# Patient Record
Sex: MALE | Race: WHITE | Employment: OTHER | URBAN - METROPOLITAN AREA
[De-identification: names, ages, dates, MRNs, and addresses within clinical notes are randomized per-mention and may not be internally consistent; named-entity substitution may affect disease eponyms.]

---

## 2019-02-01 ENCOUNTER — HOSPITAL ENCOUNTER (EMERGENCY)
Facility: CLINIC | Age: 54
Discharge: HOME | End: 2019-02-02
Attending: EMERGENCY MEDICINE | Admitting: EMERGENCY MEDICINE
Payer: MEDICARE

## 2019-02-01 DIAGNOSIS — N20.0 KIDNEY STONE: ICD-10-CM

## 2019-02-01 LAB
ALBUMIN UR-MCNC: 100 MG/DL
ANION GAP SERPL CALCULATED.3IONS-SCNC: 5 MMOL/L (ref 3–14)
APPEARANCE UR: ABNORMAL
BILIRUB UR QL STRIP: NEGATIVE
BUN SERPL-MCNC: 17 MG/DL (ref 7–30)
CALCIUM SERPL-MCNC: 8.6 MG/DL (ref 8.5–10.1)
CHLORIDE SERPL-SCNC: 105 MMOL/L (ref 94–109)
CO2 SERPL-SCNC: 29 MMOL/L (ref 20–32)
COLOR UR AUTO: ABNORMAL
CREAT SERPL-MCNC: 1.1 MG/DL (ref 0.66–1.25)
GFR SERPL CREATININE-BSD FRML MDRD: 75 ML/MIN/{1.73_M2}
GLUCOSE SERPL-MCNC: 78 MG/DL (ref 70–99)
GLUCOSE UR STRIP-MCNC: NEGATIVE MG/DL
HGB UR QL STRIP: ABNORMAL
KETONES UR STRIP-MCNC: 5 MG/DL
LEUKOCYTE ESTERASE UR QL STRIP: NEGATIVE
NITRATE UR QL: NEGATIVE
PH UR STRIP: 6 PH (ref 5–7)
POTASSIUM SERPL-SCNC: 3.4 MMOL/L (ref 3.4–5.3)
RBC #/AREA URNS AUTO: 114 /HPF (ref 0–2)
SODIUM SERPL-SCNC: 139 MMOL/L (ref 133–144)
SOURCE: ABNORMAL
SP GR UR STRIP: 1.03 (ref 1–1.03)
UROBILINOGEN UR STRIP-MCNC: NEGATIVE MG/DL (ref 0–2)
WBC #/AREA URNS AUTO: <1 /HPF (ref 0–5)

## 2019-02-01 PROCEDURE — 81001 URINALYSIS AUTO W/SCOPE: CPT | Performed by: EMERGENCY MEDICINE

## 2019-02-01 PROCEDURE — 96375 TX/PRO/DX INJ NEW DRUG ADDON: CPT

## 2019-02-01 PROCEDURE — 85025 COMPLETE CBC W/AUTO DIFF WBC: CPT | Performed by: EMERGENCY MEDICINE

## 2019-02-01 PROCEDURE — 96374 THER/PROPH/DIAG INJ IV PUSH: CPT

## 2019-02-01 PROCEDURE — 96376 TX/PRO/DX INJ SAME DRUG ADON: CPT

## 2019-02-01 PROCEDURE — 25000128 H RX IP 250 OP 636: Performed by: EMERGENCY MEDICINE

## 2019-02-01 PROCEDURE — 99285 EMERGENCY DEPT VISIT HI MDM: CPT | Mod: 25

## 2019-02-01 PROCEDURE — 80048 BASIC METABOLIC PNL TOTAL CA: CPT | Performed by: EMERGENCY MEDICINE

## 2019-02-01 RX ORDER — KETOROLAC TROMETHAMINE 30 MG/ML
30 INJECTION, SOLUTION INTRAMUSCULAR; INTRAVENOUS ONCE
Status: COMPLETED | OUTPATIENT
Start: 2019-02-01 | End: 2019-02-01

## 2019-02-01 RX ORDER — ONDANSETRON 2 MG/ML
4 INJECTION INTRAMUSCULAR; INTRAVENOUS EVERY 30 MIN PRN
Status: DISCONTINUED | OUTPATIENT
Start: 2019-02-01 | End: 2019-02-02 | Stop reason: HOSPADM

## 2019-02-01 RX ORDER — HYDROMORPHONE HYDROCHLORIDE 1 MG/ML
0.5 INJECTION, SOLUTION INTRAMUSCULAR; INTRAVENOUS; SUBCUTANEOUS
Status: DISCONTINUED | OUTPATIENT
Start: 2019-02-01 | End: 2019-02-02 | Stop reason: HOSPADM

## 2019-02-01 RX ADMIN — Medication 0.5 MG: at 23:54

## 2019-02-01 RX ADMIN — Medication 0.5 MG: at 23:23

## 2019-02-01 RX ADMIN — KETOROLAC TROMETHAMINE 30 MG: 30 INJECTION, SOLUTION INTRAMUSCULAR at 21:45

## 2019-02-01 RX ADMIN — ONDANSETRON 4 MG: 2 INJECTION INTRAMUSCULAR; INTRAVENOUS at 21:45

## 2019-02-01 NOTE — ED AVS SNAPSHOT
[facility] Emergency Department  [address]  [facility] [place] [address]  Phone:  [phone]  Fax:  [phone]                                    Luis Randhawa   MRN: [id number]    Department:  [facility] Emergency Department   Date of Visit:  2/1/2019           After Visit Summary Signature Page    I have received my discharge instructions, and my questions have been answered. I have discussed any challenges I see with this plan with the nurse or doctor.    ..........................................................................................................................................  Patient/Patient Representative Signature      ..........................................................................................................................................  Patient Representative Print Name and Relationship to Patient    ..................................................               ................................................  Date                                   Time    ..........................................................................................................................................  Reviewed by Signature/Title    ...................................................              ..............................................  Date                                               Time          22EPIC Rev 08/18

## 2019-02-02 VITALS
SYSTOLIC BLOOD PRESSURE: 142 MMHG | DIASTOLIC BLOOD PRESSURE: 85 MMHG | RESPIRATION RATE: 14 BRPM | TEMPERATURE: 98 F | OXYGEN SATURATION: 96 % | HEART RATE: 81 BPM

## 2019-02-02 LAB
BASOPHILS # BLD AUTO: 0.1 10E9/L (ref 0–0.2)
BASOPHILS NFR BLD AUTO: 1.6 %
DIFFERENTIAL METHOD BLD: ABNORMAL
EOSINOPHIL # BLD AUTO: 0.1 10E9/L (ref 0–0.7)
EOSINOPHIL NFR BLD AUTO: 1.6 %
ERYTHROCYTE [DISTWIDTH] IN BLOOD BY AUTOMATED COUNT: ABNORMAL % (ref 10–15)
HCT VFR BLD AUTO: ABNORMAL % (ref 40–53)
HGB BLD-MCNC: 14.7 G/DL (ref 13.3–17.7)
IMM GRANULOCYTES # BLD: 0 10E9/L (ref 0–0.4)
IMM GRANULOCYTES NFR BLD: 0.2 %
LYMPHOCYTES # BLD AUTO: 1.9 10E9/L (ref 0.8–5.3)
LYMPHOCYTES NFR BLD AUTO: 43 %
MCH RBC QN AUTO: ABNORMAL PG (ref 26.5–33)
MCHC RBC AUTO-ENTMCNC: ABNORMAL G/DL (ref 31.5–36.5)
MCV RBC AUTO: ABNORMAL FL (ref 78–100)
MONOCYTES # BLD AUTO: 0.5 10E9/L (ref 0–1.3)
MONOCYTES NFR BLD AUTO: 10.8 %
NEUTROPHILS # BLD AUTO: 1.9 10E9/L (ref 1.6–8.3)
NEUTROPHILS NFR BLD AUTO: 42.8 %
NRBC # BLD AUTO: 0 10*3/UL
NRBC BLD AUTO-RTO: 0 /100
PLATELET # BLD AUTO: 140 10E9/L (ref 150–450)
RBC # BLD AUTO: ABNORMAL 10E12/L (ref 4.4–5.9)
WBC # BLD AUTO: 4.4 10E9/L (ref 4–11)

## 2019-02-02 NOTE — ED PROVIDER NOTES
History     Chief Complaint:  Flank Pain      HPI   Luis Randhawa is a 53 year old male, with a history of multiple prior kidney stones, who presents with left-sided flank pain radiating down to in the left lower quadrant of his abdomen that began abruptly this afternoon prior to presentation.  The patient says the pain feels classic compared to his prior stones and he is convinced it that is the diagnosis.  He has had a recent CT scan of his abdomen (done prior to gallbladder surgery which was completed during the first week of December) that did show one intrarenal stone on the right and 2 stones intrarenally on the left.  He does not know or remember the diameters of the stones on the left but he thinks that 1 of those must be moving today.  He has not noted any urinary symptoms or hematuria.  No fevers or chills.  No rash.  No recent injury.    Allergies:  No known drug allergies.    Medications:    The patient is not currently taking any prescribed medications.     Past Medical History:    Kidney stones  Neuropathy     Past Surgical History:    Cholecystectomy     Family History:    History reviewed. No pertinent family history.    Social History:  Presents to the ED with his wife  Non-smoker  They are visiting from [place], [place]    Review of Systems  As noted above, otherwise negative    Physical Exam   First Vitals:  BP: (!) 169/100  Heart Rate: 68  Temp: 98  F (36.7  C)  Resp: 16  SpO2: 99 %      Physical Exam  Constitutional:  Appears well-developed and well-nourished. Alert.  Uncomfortable and holding his left side, but conversant. Non toxic.  HENT:   Head: Atraumatic.   Nose: Nose normal.  Mouth/Throat: Oral mucosa is clear and moist. no trismus. Pharynx normal. Tonsils symmetric. No tonsillar enlargement, erythema, or exudate.  Eyes: Conjunctivae normal. EOM normal. Pupils equal, round, and reactive to light. No scleral icterus.   Neck: Normal range of motion. Neck supple. No tracheal  deviation present.   Cardiovascular: Normal rate, regular rhythm. No gallop. No friction rub. No murmur heard. Symmetric radial artery pulses   Pulmonary/Chest: Effort normal. No stridor. No respiratory distress. No wheezes. No rales. No rhonchi . No tenderness.   Abdominal: Soft. Bowel sounds normal. No distension. No mass. No left upper quadrant tenderness or splenomegaly. 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 No rebound. No guarding.   Musculoskeletal:   RUE: Normal range of motion. No tenderness. No deformity  LUE: Normal range of motion. No tenderness. No deformity  RLE: Normal range of motion. No edema. No tenderness. No deformity  LLE: Normal range of motion. No edema. No tenderness. No deformity  Lymph: No cervical adenopathy.   Neurological: Alert and oriented to person, place, and time. Normal strength. CN II-VII intact. No sensory deficit. GCS eye subscore is 4. GCS verbal subscore is 5. GCS motor subscore is 6. Normal coordination   Skin: Skin is warm and dry. No rash noted. No pallor. Normal capillary refill.  Psychiatric:  Normal mood. Normal affect.     Emergency Department Course     Laboratory:  CBC:  WBC 4.4, HGB 14.7,  (L)  BMP: WNL (Creatinine 1.10)  UA: Cloudy red urine, ketones 5, large blood,  (H), otherwise WNL    Interventions:  2145: Zofran, 4 mg, IV injection   2145: Toradol, 15 mg, IV injection  Recheck pain actually worse up to an 8 or 9/10  2323: Dilaudid, 0.5 mg, IV injection x3    Emergency Department Course:  The patient arrived in triage where vitals were measured and recorded.   The patient was then escorted back to the emergency department.   The patient's medical records were reviewed.  Nursing notes and vitals were reviewed.  2150: I performed an exam of the patient as documented above.  The above workup was undertaken.  0033: I rechecked the patient and discussed results.  Findings and plan explained to the Patient. Patient discharged  home, status improved, with instructions regarding supportive care, medications, and reasons to return as well as the importance of close follow-up was reviewed.     Impression & Plan      Medical Decision Making:  Luis Randhawa is a 53 year old male who presents with left flank pain radiating into his left lower quadrant.    Differential Diagnosis considered includes:  AAA, Ureterolithiasis, UTI, pyelonephritis, appendicitis, colitis, cholecystitis, diverticulitis, volvulus, among others.  At this point, the evaluation indicates that ureterolithiasis is the cause of the patient's symptoms. Will forgo imaging at this time given hx of stones, previous imaging and classic story.  Patient understands therefore that we cannot know how big stone is nor probability of passage.  There are no signs that this is an infected stone. The patient's pain is controlled in ED. initially he had worsening pain after Toradol and his first round of Dilaudid, but after subsequent doses he is feeling tremendously better and he says his pain is down to a 2/10 now.  The patient is hemodynamically stable in ED. I think the patient is safe for discharge.     The plan is discharge to home with recheck by primary care physician or urology.They will return to the ED right away if symptoms worsen (e.g Return immediately for fevers greater than 102, increasing pain, other new symptoms develop).  Ureterolithiasis precautions for home.  Patient declines any prescriptions for  pain control, nausea control, and flomax. The patient's questions were answered.  He is comfortable with plan for discharge.  His wife will drive.  He will return to the ER with any recurring or uncontrolled pain or any other worsening symptoms.  Otherwise he will follow-up with his doctors in [place] when he arrives home.    Diagnosis:    ICD-10-CM    1. Kidney stone N20.0        Disposition:  Discharged to home.         IPatricia, am serving as a scribe on  2/1/2019 at 9:50 PM to personally document services performed by Dr. Laughlin based on my observations and the provider's statements to me.   [facility] EMERGENCY DEPARTMENT       Luis Laughlin MD  02/02/19 0137